# Patient Record
Sex: FEMALE
[De-identification: names, ages, dates, MRNs, and addresses within clinical notes are randomized per-mention and may not be internally consistent; named-entity substitution may affect disease eponyms.]

---

## 2018-01-07 ENCOUNTER — HOSPITAL ENCOUNTER (EMERGENCY)
Dept: HOSPITAL 89 - ER | Age: 23
Discharge: HOME | End: 2018-01-07
Payer: COMMERCIAL

## 2018-01-07 VITALS — HEIGHT: 65 IN | WEIGHT: 100 LBS | BODY MASS INDEX: 16.66 KG/M2

## 2018-01-07 VITALS — SYSTOLIC BLOOD PRESSURE: 115 MMHG | DIASTOLIC BLOOD PRESSURE: 72 MMHG
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
## 2018-01-07 DIAGNOSIS — S52.611A: ICD-10-CM

## 2018-01-07 DIAGNOSIS — S52.571A: Primary | ICD-10-CM

## 2018-01-07 PROCEDURE — 96374 THER/PROPH/DIAG INJ IV PUSH: CPT

## 2018-01-07 PROCEDURE — 29125 APPL SHORT ARM SPLINT STATIC: CPT

## 2018-01-07 PROCEDURE — 99283 EMERGENCY DEPT VISIT LOW MDM: CPT

## 2018-01-07 PROCEDURE — 73110 X-RAY EXAM OF WRIST: CPT

## 2018-01-07 PROCEDURE — 96361 HYDRATE IV INFUSION ADD-ON: CPT

## 2018-01-07 NOTE — ER REPORT
History and Physical


Time Seen By MD:  17:25


Allergies:  


Coded Allergies:  


     No Known Drug Allergies (Unverified , 1/7/18)


Home Meds


No Active Prescriptions or Reported Meds


Constitutional





Vital Sign - Last 24 Hours








 1/7/18





 17:25


 


Temp 98.6


 


Pulse 71


 


Resp 16


 


B/P (MAP) 110/77


 


Pulse Ox 98


 


O2 Delivery Room Air











Depart


Departure


Latest Vital Signs





Vital Signs








  Date Time  Temp Pulse Resp B/P (MAP) Pulse Ox O2 Delivery O2 Flow Rate FiO2


 


1/7/18 17:25 98.6 71 16 110/77 98 Room Air  








New Scripts


No Active Prescriptions or Reported Meds











MOIZ DUQUE MD Jan 7, 2018 17:27

## 2018-01-07 NOTE — ER REPORT
History and Physical


Time Seen By MD:  17:54


Hx. of Stated Complaint:  


pt reports R wrist pain from fall while snowboarding


HPI/ROS


CHIEF COMPLAINT: Right wrist pain





HISTORY OF PRESENT ILLNESS: 22-year-old female patient presents to emergency 

room with complaint of right wrist pain. Patient states that she was 

snowboarding this afternoon and felt backwards. She states that she put her 

hand out to catch herself. She states that she did develop significant amounts 

of pain in the wrist that time. She states that they did place her in a sling 

and referred to come here to the emergency room. Patient denies any numbness 

tingling to the hand. She states that her pain is a 9 out of 10. States that 

she is unable to move her fingers at all due to the pain. Patient denies any 

previous injury to the wrist.





REVIEW OF SYSTEMS:


Respiratory: No cough, no dyspnea.


Cardiovascular: No chest pain, no palpitations.


Gastrointestinal: No vomiting, no abdominal pain.


Musculoskeletal: As noted above


Allergies:  


Coded Allergies:  


     No Known Drug Allergies (Unverified , 1/7/18)


Home Meds


Active Scripts


Hydrocodone Bit/Acetaminophen (HYDROCODON-ACETAMINOPHEN 5-325) 1 Each Tablet, 1 

EACH PO Q4-6H Y for PAIN, #12 TAB


   Prov:OSCAR ALCALA         1/7/18


Past Medical/Surgical History


Patient denies any pertinent medical or surgical history.


Reviewed Nurses Notes:  Yes


Constitutional





Vital Sign - Last 24 Hours








 1/7/18 1/7/18 1/7/18 1/7/18





 17:25 18:22 18:27 18:30


 


Temp 98.6   


 


Pulse 71 95 95 


 


Resp 16   


 


B/P (MAP) 110/77  108/69 (82) 109/63 (78)


 


Pulse Ox 98 96 94 


 


O2 Delivery Room Air   


 


    





 1/7/18 1/7/18 1/7/18 1/7/18





 18:32 18:37 18:42 18:47


 


Pulse 96 81 81 82


 


Pulse Ox 94 96 93 97





 1/7/18 1/7/18 1/7/18 1/7/18





 18:52 18:57 19:00 19:11


 


Pulse 87 89  


 


B/P (MAP)   97/60 (72) 107/67 (80)


 


Pulse Ox 97 95  





 1/7/18   





 19:17   


 


Pulse 85   


 


Resp 16   


 


B/P (MAP) 115/72 (86)   


 


Pulse Ox 95   


 


O2 Delivery Room Air   














Intake and Output   


 


 1/7/18 1/7/18 1/8/18





 15:01 23:01 07:01


 


Intake Total  350 ml 


 


Balance  350 ml 








Physical Exam


  General Appearance: The patient is alert, has no immediate need for airway 

protection and no current signs of toxicity.


Respiratory: Chest is non tender, lungs are clear to auscultation.


Cardiac: regular rate and rhythm


Musculoskeletal:  Neck: Neck is supple and non tender.


   Extremities have full range of motion and are non tender. Patient has 

obvious deformity to the right wrist, no numbness tingling noted in the 

fingers. Patient was able to move her fingers but did have significant pain 

with movement. There is no break in the skin.


Skin: No rashes or lesions.





DIFFERENTIAL DIAGNOSIS: After history and physical exam differential diagnosis 

was considered for wrist sprain, wrist contusion, wrist fracture.





Medical Decision Making


EKG/Imaging


Imaging


INDICATION:  fall with pain.  Fall. Pain.


 


DATE: 1/7/2018 6:45 PM.


 


TECHNIQUE: WRIST RIGHT MIN 3 VIEW


 


COMPARISON: None


 


 


FINDINGS: Distal radius fracture extends transversely with extension to the 

articular surface on the dorsal side. The radial articular surface is tilted 

dorsally. There is also a mildly displaced ulnar styloid fracture.


 


IMPRESSION:


Distal radius fracture as above.


 


Ulnar styloid fracture.


 


Report Dictated By: Dante Ruiz MD at 1/7/2018 6:45 PM


 


Report E-Signed By: Dante Ruiz MD  at 1/7/2018 6:48 PM





ED Course/Re-evaluation


ED Course


Patient was admitted to an exam room, history and physical were obtained. 

Differential diagnoses were considered. On examination patient has obvious 

deformity of the right wrist. Patient does have good sensation and good 

capillary refill to the fingers of the right hand. An x-ray was done of the 

right wrist, which shows a displaced fracture. It does go into the articular 

space. With the number of pieces of a fracture of the wrist I do not believe 

that I will be able to reduce it successfully and have it remain reduced. I 

discussed this with the patient. We did place her in a sugar tong splint. We 

will discharge her home with a limited supply of pain medication and a 

prescription. I did show the x-rays to the patient. I do want her to follow-up 

with orthopedics. I suspect she will have to have surgery for proper healing. 

The patient verbalized understanding and agreement.








Procedure:  Splint placement.





A sugar tong splint was applied.  After application of the splint I returned 

and re-examined the patient.  The splint was adequately immobilizing the joint 

and distal to the splint the patient's circulation and sensation was intact.


Decision to Disposition Date:  Jan 7, 2018


Decision to Disposition Time:  19:12





Depart


Departure


Latest Vital Signs





Vital Signs








  Date Time  Temp Pulse Resp B/P (MAP) Pulse Ox O2 Delivery O2 Flow Rate FiO2


 


1/7/18 19:17  85 16 115/72 (86) 95 Room Air  


 


1/7/18 17:25 98.6       








Impression:  


 Primary Impression:  


 Distal radius fracture, right


 Additional Impression:  


 Fracture of ulnar styloid


Condition:  Improved


Disposition:  HOME OR SELF-CARE


New Scripts


Hydrocodone Bit/Acetaminophen (HYDROCODON-ACETAMINOPHEN 5-325) 1 Each Tablet


1 EACH PO Q4-6H Y for PAIN, #12 TAB


   Prov: OSCAR ALCALA         1/7/18


Patient Instructions:  Wrist Fracture in Adults (ED)





Additional Instructions:  


Limit activity by pain.


Ice the wrist through the splint; 2-3 times a day for 20-30 minutes.


If the splint is feeling too tight you may loosen the ace wrap and rewrap it.


Follow up with orthopedics in [place], call tomorrow to make an appointment.


Keep the splint dry, wrap it with a bag and tape to keep the water out.


Return to the ER with uncontrollable pain or numbness to the hand.


You may take Ibuprofen as needed for pain in addition to the pain medication.


Don't take any additional Tylenol while on the pain medication.





Problem Qualifiers








 Primary Impression:  


 Distal radius fracture, right


 Encounter type:  initial encounter  Fracture type:  closed  Fracture morphology

:  other intra-articular  Qualified Codes:  S52.571A - Other intraarticular 

fracture of lower end of right radius, initial encounter for closed fracture


 Additional Impression:  


 Fracture of ulnar styloid


 Encounter type:  initial encounter  Fracture type:  closed  Fracture alignment

:  displaced  Laterality:  right  Qualified Codes:  S52.611A - Displaced 

fracture of right ulna styloid process, initial encounter for closed fracture








OSCAR ALCALA Jan 7, 2018 17:55

## 2018-01-07 NOTE — RADIOLOGY IMAGING REPORT
FACILITY: [facility] 

 

PATIENT NAME: Racquel Alberts

: 1995

MR: [id number]

V: [id number]

EXAM DATE: 

ORDERING PHYSICIAN: OSCAR ALCALA

TECHNOLOGIST: 

 

Location: [facility]

Patient: Racuqel Alberts

: 1995

MRN: [id number]

Visit/Account:[id number]

Date of Sevice:  2018

 

ACCESSION #: [id number]

 

 

INDICATION:  fall with pain.  Fall. Pain.

 

DATE: 2018 6:45 PM.

 

TECHNIQUE: WRIST RIGHT MIN 3 VIEW

 

COMPARISON: None

 

 

FINDINGS: Distal radius fracture extends transversely with extension to the articular surface on the 
dorsal side. The radial articular surface is tilted dorsally. There is also a mildly displaced ulnar 
styloid fracture.

 

IMPRESSION:

Distal radius fracture as above.

 

Ulnar styloid fracture.

 

Report Dictated By: Dante Ruiz MD at 2018 6:45 PM

 

Report E-Signed By: Dante Ruiz MD  at 2018 6:48 PM

 

WSN:[id number]